# Patient Record
Sex: FEMALE | Race: WHITE | NOT HISPANIC OR LATINO | Employment: UNEMPLOYED | URBAN - METROPOLITAN AREA
[De-identification: names, ages, dates, MRNs, and addresses within clinical notes are randomized per-mention and may not be internally consistent; named-entity substitution may affect disease eponyms.]

---

## 2018-04-09 ENCOUNTER — HOSPITAL ENCOUNTER (OUTPATIENT)
Dept: URGENT CARE | Facility: CLINIC | Age: 28
Discharge: HOME | End: 2018-04-09
Attending: FAMILY MEDICINE | Admitting: FAMILY MEDICINE

## 2018-05-02 ENCOUNTER — HOSPITAL ENCOUNTER (OUTPATIENT)
Dept: PHYSICAL THERAPY | Facility: HOSPITAL | Age: 28
Setting detail: RECURRING SERIES
Discharge: HOME | End: 2018-07-29
Attending: FAMILY MEDICINE | Admitting: FAMILY MEDICINE

## 2019-05-30 ENCOUNTER — CONVERSION ENCOUNTER (OUTPATIENT)
Dept: URGENT CARE | Facility: CLINIC | Age: 29
End: 2019-05-30

## 2019-06-04 VITALS
HEART RATE: 126 BPM | WEIGHT: 153 LBS | HEIGHT: 66 IN | DIASTOLIC BLOOD PRESSURE: 84 MMHG | BODY MASS INDEX: 24.59 KG/M2 | RESPIRATION RATE: 16 BRPM | OXYGEN SATURATION: 100 % | SYSTOLIC BLOOD PRESSURE: 122 MMHG

## 2019-06-06 NOTE — PROGRESS NOTES
Visit Type:  Acute Visit  Referring Provider:  ULISES Garcia  Primary Provider:  David    CC:  sore throat/aches.    History of Present Illness:  Sore throat and earache x 2 days. Body aches, subjective fever/chills starting yesterday. Around some people who have strep. No n/v/d. A little cough/congestion      Vital Signs:    Patient Profile:    29 Years Old Female  LMP:        2019  Height:     66 inches  Weight:     153 pounds  BMI:        24.69     O2 Sat:     100 %  Temp:       99.0 degrees F oral  Pulse rate: 126 / minute  Resp:       16 per minute  BP Sittin / 84  (right arm)    Cuff size:  regular  Patient has a risk of falls? No  Patient in pain?    No    Problems: Active problems were reviewed with the patient during this visit.  Medications: Medications were reviewed with the patient during this visit.  Allergies: Allergies were reviewed with the patient during this visit.  No Known Allergy.  No Known Drug Allergy.    Last MP: 2019      Vitals Entered By: Kanwal Mcclain RN (May 30, 2019 8:05 AM)    Active Medications (reviewed today):  LOMOTIL 2.5-0.025 MG ORAL TABLET (DIPHENOXYLATE-ATROPINE) 1 tablet every 6 hours prn diarrhea  PROMETHAZINE HCL 25 MG ORAL TABLET (PROMETHAZINE HCL) 1 tablet q 4 hours prn nausea  * BIRTH CONTROL PILL     Current Allergies (reviewed today):  No known allergies      Past Medical History:     Reviewed history from 2016 and no changes required:        aplastic anemia    Past Surgical History:     Reviewed history from 2016 and no changes required:        Unremarkable    Family History Summary:      Reviewed history Last on 2018 and no changes required:2019  First Degree Blood Relative - Has No Known Family History - Entered On: 2016      Social History:     Reviewed history from 2016 and no changes required:        Patient has never smoked.        Passive Smoke: N        Alcohol Use: N        Drug Use: N        HIV/High  Risk: N        Regular Exercise: N        Hx Domestic Abuse: N        [employer] Affecting Care: N            Social History Summary:  Patient has never smoked.  Passive Smoke: N  Alcohol Use: N  Drug Use: N  HIV/High Risk: N  Regular Exercise: N  Hx Domestic Abuse: N  [employer] Affecting Care: N  Social History Reviewed: 05/30/2019          Risk Factors:     Smoked Tobacco Use:  Never smoker  Passive smoke exposure:  no  Alcohol use:  no      Review of Systems   General: Complains of headache, fevers, chills, body ache, fatigue.   Eyes: Denies irritation, discharge.   Ears/Nose/Throat: Complains of earache, nasal congestion, sore throat. Denies ear discharge, decreased hearing.   Respiratory: Complains of cough. Denies dyspnea, excessive sputum.   Gastrointestinal: Denies nausea, vomiting, diarrhea.   Skin: Denies rash, itching.         Physical Exam    General:      well developed, well nourished, in no acute distress.    Head:      normocephalic and atraumatic.    Eyes:      PERRL/EOM intact, conjunctiva and sclera clear with out nystagmus.    Ears:      clear fluid bilateral TMs, otherwise normal  Nose:      no deformity, discharge, inflammation, or lesions.    Mouth:      moderately injected and swollen. No exudate. Uvula midline  Neck:      no masses, thyromegaly, or abnormal cervical nodes.    Lungs:      clear bilaterally to auscultation.    Heart:      non-displaced PMI, chest non-tender; regular rate and rhythm, S1, S2 without murmurs, rubs, or gallops  Skin:      intact without lesions or rashes.    Psych:      alert and cooperative; normal mood and affect; normal attention span and concentration.        Blood Pressure:  Today's BP: 122/84 mm Hg            Impression & Recommendations:    Problem # 1:  Strep pharyngitis (ICD-034.0) (XFE57-W69.0)  Assessment: New    Her updated medication list for this problem includes:     Amoxicillin 875 Mg Oral Tablet (Amoxicillin) ..... 1 po bid      Medications Added to  Medication List This Visit:  1)  Amoxicillin 875 Mg Oral Tablet (Amoxicillin) .... 1 po bid    Other Orders:  Strep Screen (75693)      Patient Instructions:  1)  Your diagnosis of strep throat or pharyngitis is ususally associated with a bacterial infection. The infection will be treated with antibiotics. Other treatments may include Tylenol or ibuprofen, warm salt water, lozenges, lots of liquids and rest.   Discard your toothbrushes after 48 hours. If you have any unusual symptoms you should report them to your family doctor or return to the Urgent Care Center.  2)  The exam and treatment that you have received at the Urgent Care has been rendered on an urgent or immediate care basis only and is not intended to to be a substitute for your primary care doctor. It is important that you report any persistant or   worsening problems and see your physician for follow up care. It is impossible to recognize and treat all elements of an injury or illness in a single visit. If you are unable to contact your physician please call or return to the Urgent Care Center.      ]          Laceration/ Wound     Objective     cm  Assessment:     Plan:   Rx:   AMOXICILLIN 875 MG ORAL TABLET 1 po bid.      Technician: Mckenzie Baer MA               Date/Time Collected: May 30, 2019 8:21 AM)  Date/Time Received: May 30, 2019 8:21 AM)  Rapid Strep, Group A:    positive       Negative (normal range)          Electronically signed by Mary MONTGOMERY on 05/30/2019 at 8:23 AM  ________________________________________________________________________       Disclaimer: Converted Note message may not contain all data elements that existed in the legacy source system. Please see [employer] [facility] for the original note details.

## 2019-08-05 ENCOUNTER — APPOINTMENT (OUTPATIENT)
Dept: GENERAL RADIOLOGY | Facility: HOSPITAL | Age: 29
End: 2019-08-05

## 2019-08-05 ENCOUNTER — HOSPITAL ENCOUNTER (EMERGENCY)
Facility: HOSPITAL | Age: 29
Discharge: HOME | End: 2019-08-05
Admitting: EMERGENCY MEDICINE

## 2019-08-05 ENCOUNTER — TELEPHONE (OUTPATIENT)
Dept: CARDIOLOGY | Facility: CLINIC | Age: 29
End: 2019-08-05

## 2019-08-05 ENCOUNTER — APPOINTMENT (OUTPATIENT)
Dept: RESPIRATORY THERAPY | Facility: HOSPITAL | Age: 29
End: 2019-08-05

## 2019-08-05 VITALS
BODY MASS INDEX: 24.31 KG/M2 | OXYGEN SATURATION: 100 % | DIASTOLIC BLOOD PRESSURE: 59 MMHG | RESPIRATION RATE: 19 BRPM | SYSTOLIC BLOOD PRESSURE: 97 MMHG | WEIGHT: 151.24 LBS | HEART RATE: 82 BPM | TEMPERATURE: 98.8 F | HEIGHT: 66 IN

## 2019-08-05 DIAGNOSIS — R00.2 HEART PALPITATIONS: Primary | ICD-10-CM

## 2019-08-05 LAB
ALBUMIN SERPL-MCNC: 4.1 G/DL (ref 3.5–4.8)
ALBUMIN/GLOB SERPL: 1.3 G/DL (ref 1–1.7)
ALP SERPL-CCNC: 69 U/L (ref 32–91)
ALT SERPL W P-5'-P-CCNC: 14 U/L (ref 14–54)
ANION GAP SERPL CALCULATED.3IONS-SCNC: 14.9 MMOL/L (ref 5–15)
AST SERPL-CCNC: 22 U/L (ref 15–41)
B-HCG UR QL: NEGATIVE
BASOPHILS # BLD AUTO: 0 10*3/MM3 (ref 0–0.2)
BASOPHILS NFR BLD AUTO: 0.4 % (ref 0–1.5)
BILIRUB SERPL-MCNC: 0.7 MG/DL (ref 0.3–1.2)
BNP SERPL-MCNC: 32 PG/ML
BUN BLD-MCNC: 6 MG/DL (ref 8–20)
BUN/CREAT SERPL: 10 (ref 5.4–26.2)
CALCIUM SPEC-SCNC: 9.3 MG/DL (ref 8.9–10.3)
CHLORIDE SERPL-SCNC: 103 MMOL/L (ref 101–111)
CO2 SERPL-SCNC: 24 MMOL/L (ref 22–32)
CREAT BLD-MCNC: 0.6 MG/DL (ref 0.4–1)
D DIMER PPP FEU-MCNC: 0.5 MCGFEU/ML (ref 0.17–0.59)
DEPRECATED RDW RBC AUTO: 54.7 FL (ref 37–54)
EOSINOPHIL # BLD AUTO: 0 10*3/MM3 (ref 0–0.4)
EOSINOPHIL NFR BLD AUTO: 0.4 % (ref 0.3–6.2)
ERYTHROCYTE [DISTWIDTH] IN BLOOD BY AUTOMATED COUNT: 13.8 % (ref 12.3–15.4)
GFR SERPL CREATININE-BSD FRML MDRD: 117 ML/MIN/1.73
GLOBULIN UR ELPH-MCNC: 3.2 GM/DL (ref 2.5–3.8)
GLUCOSE BLD-MCNC: 114 MG/DL (ref 65–99)
HCT VFR BLD AUTO: 38 % (ref 34–46.6)
HGB BLD-MCNC: 13.4 G/DL (ref 12–15.9)
INR PPP: 0.94 (ref 0.9–1.1)
LYMPHOCYTES # BLD AUTO: 1.1 10*3/MM3 (ref 0.7–3.1)
LYMPHOCYTES NFR BLD AUTO: 24.6 % (ref 19.6–45.3)
MCH RBC QN AUTO: 39.3 PG (ref 26.6–33)
MCHC RBC AUTO-ENTMCNC: 35.2 G/DL (ref 31.5–35.7)
MCV RBC AUTO: 111.4 FL (ref 79–97)
MONOCYTES # BLD AUTO: 0.5 10*3/MM3 (ref 0.1–0.9)
MONOCYTES NFR BLD AUTO: 10.2 % (ref 5–12)
NEUTROPHILS # BLD AUTO: 2.9 10*3/MM3 (ref 1.7–7)
NEUTROPHILS NFR BLD AUTO: 64.4 % (ref 42.7–76)
NRBC BLD AUTO-RTO: 0.1 /100 WBC (ref 0–0.2)
PLATELET # BLD AUTO: 116 10*3/MM3 (ref 140–450)
PMV BLD AUTO: 7.9 FL (ref 6–12)
POTASSIUM BLD-SCNC: 3.9 MMOL/L (ref 3.6–5.1)
PROT SERPL-MCNC: 7.3 G/DL (ref 6.1–7.9)
PROTHROMBIN TIME: 9.8 SECONDS (ref 9.6–11.7)
RBC # BLD AUTO: 3.41 10*6/MM3 (ref 3.77–5.28)
SODIUM BLD-SCNC: 138 MMOL/L (ref 136–144)
TROPONIN I SERPL-MCNC: <0.03 NG/ML (ref 0–0.03)
WBC NRBC COR # BLD: 4.5 10*3/MM3 (ref 3.4–10.8)

## 2019-08-05 PROCEDURE — 93005 ELECTROCARDIOGRAM TRACING: CPT | Performed by: NURSE PRACTITIONER

## 2019-08-05 PROCEDURE — 83880 ASSAY OF NATRIURETIC PEPTIDE: CPT | Performed by: NURSE PRACTITIONER

## 2019-08-05 PROCEDURE — 71046 X-RAY EXAM CHEST 2 VIEWS: CPT

## 2019-08-05 PROCEDURE — 25010000002 KETOROLAC TROMETHAMINE PER 15 MG: Performed by: PHYSICIAN ASSISTANT

## 2019-08-05 PROCEDURE — 85379 FIBRIN DEGRADATION QUANT: CPT | Performed by: NURSE PRACTITIONER

## 2019-08-05 PROCEDURE — 96374 THER/PROPH/DIAG INJ IV PUSH: CPT

## 2019-08-05 PROCEDURE — 85610 PROTHROMBIN TIME: CPT | Performed by: NURSE PRACTITIONER

## 2019-08-05 PROCEDURE — 93225 XTRNL ECG REC<48 HRS REC: CPT

## 2019-08-05 PROCEDURE — 80053 COMPREHEN METABOLIC PANEL: CPT | Performed by: NURSE PRACTITIONER

## 2019-08-05 PROCEDURE — 84484 ASSAY OF TROPONIN QUANT: CPT | Performed by: NURSE PRACTITIONER

## 2019-08-05 PROCEDURE — 81025 URINE PREGNANCY TEST: CPT | Performed by: NURSE PRACTITIONER

## 2019-08-05 PROCEDURE — 99284 EMERGENCY DEPT VISIT MOD MDM: CPT

## 2019-08-05 PROCEDURE — 85025 COMPLETE CBC W/AUTO DIFF WBC: CPT | Performed by: NURSE PRACTITIONER

## 2019-08-05 PROCEDURE — 96375 TX/PRO/DX INJ NEW DRUG ADDON: CPT

## 2019-08-05 RX ORDER — SODIUM CHLORIDE 0.9 % (FLUSH) 0.9 %
10 SYRINGE (ML) INJECTION AS NEEDED
Status: DISCONTINUED | OUTPATIENT
Start: 2019-08-05 | End: 2019-08-05 | Stop reason: HOSPADM

## 2019-08-05 RX ORDER — KETOROLAC TROMETHAMINE 15 MG/ML
15 INJECTION, SOLUTION INTRAMUSCULAR; INTRAVENOUS ONCE
Status: COMPLETED | OUTPATIENT
Start: 2019-08-05 | End: 2019-08-05

## 2019-08-05 RX ADMIN — KETOROLAC TROMETHAMINE 15 MG: 15 INJECTION, SOLUTION INTRAMUSCULAR; INTRAVENOUS at 11:18

## 2019-08-05 RX ADMIN — FAMOTIDINE 20 MG: 10 INJECTION, SOLUTION INTRAVENOUS at 11:18

## 2019-08-05 RX ADMIN — SODIUM CHLORIDE 500 ML: 900 INJECTION, SOLUTION INTRAVENOUS at 11:18

## 2019-08-05 NOTE — ED NOTES
Pt reports heart palpations, has an appointment w/Dr Leahy tomorrow. Pt reports when she tries to rest, she feels her heart pounding. Denies SOA with palpitations.      Mariela Sol RN  08/05/19 [id number]

## 2019-08-05 NOTE — ED PROVIDER NOTES
Subjective   History:  She is a 29-year-old female presents to the ER with reported constant palpitations since Thursday.  Reports that she did take a Claritin with pseudoephedrine and her doctor believe that her palpitations may be secondary to this but they have not gone away and she DC that medicine.  She has follow-up scheduled tomorrow with cardiologist.  Denies any chest pain or chest pressure does report some reflux.    Onset: 4 to 5 days  Location: Chest  Duration: Constant  Character: Palpitations  Aggravating/Alleviating factors: None  Radiation none  Severity: Mild              Review of Systems   HENT: Negative.    Eyes: Negative.    Respiratory: Negative.    Cardiovascular: Positive for palpitations. Negative for chest pain.   Gastrointestinal: Negative.    Genitourinary: Negative.    Musculoskeletal: Negative.    Skin: Negative.    Neurological: Negative.    Psychiatric/Behavioral: Negative.        No past medical history on file.    Allergies   Allergen Reactions   • Amoxicillin Palpitations       No past surgical history on file.    No family history on file.    Social History     Socioeconomic History   • Marital status:      Spouse name: Not on file   • Number of children: Not on file   • Years of education: Not on file   • Highest education level: Not on file           Objective   Physical Exam   Constitutional: She is oriented to person, place, and time. She appears well-developed and well-nourished.   HENT:   Head: Normocephalic and atraumatic.   Eyes: Pupils are equal, round, and reactive to light.   Neck: Normal range of motion.   Cardiovascular: Normal rate and regular rhythm.   Pulmonary/Chest: Effort normal and breath sounds normal.   Abdominal: Soft.   Musculoskeletal: Normal range of motion.   Neurological: She is alert and oriented to person, place, and time.   Skin: Skin is warm and dry.   Psychiatric: She has a normal mood and affect. Her behavior is normal.       Procedures            ED Course      Xr Chest 2 View    Result Date: 8/5/2019  No acute chest findings. No significant change since 04/09/2018.  Electronically Signed By-Dr. Richelle Flores MD On:8/5/2019 10:32 AM This report was finalized on [id number] by Dr. Richelle Flores MD.    Labs Reviewed   COMPREHENSIVE METABOLIC PANEL - Abnormal; Notable for the following components:       Result Value    Glucose 114 (*)     BUN 6 (*)     All other components within normal limits   CBC WITH AUTO DIFFERENTIAL - Abnormal; Notable for the following components:    RBC 3.41 (*)     .4 (*)     MCH 39.3 (*)     RDW-SD 54.7 (*)     Platelets 116 (*)     All other components within normal limits   PROTIME-INR - Normal   BNP (IN-HOUSE) - Normal   TROPONIN (IN-HOUSE) - Normal    Narrative:     Troponin I Reference Range:    0.00-0.03  Negative.  Repeat testing in 4-6 hours if clinically indicated.    0.04-0.29  Suspicious for myocardial injury. Serial measurements and clinical  correlation may be necessary to confirm or exclude diagnosis of acute  coronary syndrome.  Repeat testing in 4-6 hours if indicated.     >0.29 Consistent with myocardial injury.  Recommend clinical and laboratory correlation.     Results my be falsely decreased if patient taking Biotin.    D-DIMER, QUANTITATIVE - Normal    Narrative:     Reference Range  --------------------------------------------------------------------     < 0.50   Negative Predictive Value  0.50-0.59   Indeterminate    >= 0.60   Probable VTE             A very low percentage of patients with DVT may yield D-Dimer results   below the cut-off of 0.50 MCGFEU/mL.  This is known to be more   prevalent in patients with distal DVT.             Results of this test should always be interpreted in conjunction with   the patient's medical history, clinical presentation and other   findings.  Clinical diagnosis should not be based on the result of   INNOVANCE D-Dimer alone.   PREGNANCY, URINE - Normal   CBC  AND DIFFERENTIAL    Narrative:     The following orders were created for panel order CBC & Differential.  Procedure                               Abnormality         Status                     ---------                               -----------         ------                     Scan Slide[[id number]]                                                                  CBC Auto Differential[[id number]]        Abnormal            Final result                 Please view results for these tests on the individual orders.     Medications   sodium chloride 0.9 % flush 10 mL (not administered)   sodium chloride 0.9 % bolus 500 mL (500 mL Intravenous New Bag 8/5/19 1118)   ketorolac (TORADOL) injection 15 mg (15 mg Intravenous Given 8/5/19 1118)   famotidine (PEPCID) injection 20 mg (20 mg Intravenous Given 8/5/19 1118)                 MDM  Number of Diagnoses or Management Options  Heart palpitations:   Diagnosis management comments: DISPOSITION:   Chart Review:  Comorbidity:  has no past medical history on file.  Differentials:this list is not all inclusive and does not constitute the entirety of considered causes --> palpitations secondary to cardiac etiology secondary to recent psuedophedrine use, PE  ECG: interpreted by ER physician and reviewed by myself: Sinus rhythm  Labs: CBC - WBC/Hgb/Hct/Plts: --/13.4/38.0/-- (08/05 1009) LYTES - Na/K/Cl/CO2: 138/3.9/103/24.0 (08/05 1009) CHEM - BUN/Cr/glu/ALT/AST/amyl/lip:6*/--/--/14/22/--/-- (08/05 1009) COAG - PT/INR/PTT: 9.8/0.94/-- (08/05 1009) .rr    Imaging: Was interpreted by physician and reviewed by myself:  Xr Chest 2 View    Result Date: 8/5/2019  No acute chest findings. No significant change since 04/09/2018.  Electronically Signed By-Dr. Richelle Flores MD On:8/5/2019 10:32 AM This report was finalized on [id number] by Dr. Richelle Flores MD.      Disposition/Treatment:  Patient is a 29-year-old female presents to the ER with consistent heart palpitations for the past  4 days.  Patient was given Protonix and Toradol in the ER her heart palpitations did come down there is no evidence of heart palpitations on EKG.  Her lab work is all normal chest x-ray negative. She was discharged home with a holter monitor and told to follow up tomorrow with Dr. Parmar. She was stable and in agreement with plan.  Low heart score.       Amount and/or Complexity of Data Reviewed  Clinical lab tests: reviewed  Tests in the radiology section of CPT®: reviewed  Tests in the medicine section of CPT®: reviewed    Patient Progress  Patient progress: stable        Final diagnoses:   Heart palpitations            Theresa Carrington PA-C  08/05/19 1202

## 2019-08-05 NOTE — TELEPHONE ENCOUNTER
Pt c/o rapid heart rate and pounding chest.   Says has been able to sleep last 4 days   Resting heart rate at 100 or above. A little dizzy no soa.  Advised to go to ER  Understood.

## 2019-08-06 ENCOUNTER — OFFICE VISIT (OUTPATIENT)
Dept: CARDIOLOGY | Facility: CLINIC | Age: 29
End: 2019-08-06

## 2019-08-06 VITALS
HEIGHT: 66 IN | WEIGHT: 151 LBS | SYSTOLIC BLOOD PRESSURE: 121 MMHG | BODY MASS INDEX: 24.27 KG/M2 | DIASTOLIC BLOOD PRESSURE: 83 MMHG | HEART RATE: 81 BPM | OXYGEN SATURATION: 100 %

## 2019-08-06 DIAGNOSIS — R00.2 PALPITATIONS: Primary | ICD-10-CM

## 2019-08-06 PROCEDURE — 99203 OFFICE O/P NEW LOW 30 MIN: CPT | Performed by: INTERNAL MEDICINE

## 2019-08-06 RX ORDER — LEVONORGESTREL AND ETHINYL ESTRADIOL 0.1-0.02MG
1 KIT ORAL DAILY
Refills: 0 | COMMUNITY
Start: 2019-05-19
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
## 2019-08-09 PROCEDURE — 93227 XTRNL ECG REC<48 HR R&I: CPT | Performed by: INTERNAL MEDICINE

## 2019-08-12 ENCOUNTER — OFFICE (AMBULATORY)
Dept: URBAN - METROPOLITAN AREA CLINIC 64 | Facility: CLINIC | Age: 29
End: 2019-08-12

## 2019-08-12 VITALS
WEIGHT: 150 LBS | HEIGHT: 65 IN | DIASTOLIC BLOOD PRESSURE: 82 MMHG | SYSTOLIC BLOOD PRESSURE: 119 MMHG | HEART RATE: 77 BPM

## 2019-08-12 DIAGNOSIS — R00.2 PALPITATIONS: ICD-10-CM

## 2019-08-12 DIAGNOSIS — K21.9 GASTRO-ESOPHAGEAL REFLUX DISEASE WITHOUT ESOPHAGITIS: ICD-10-CM

## 2019-08-12 DIAGNOSIS — R07.89 OTHER CHEST PAIN: ICD-10-CM

## 2019-08-12 PROCEDURE — 99202 OFFICE O/P NEW SF 15 MIN: CPT | Performed by: INTERNAL MEDICINE

## 2019-08-12 RX ORDER — OMEPRAZOLE 40 MG/1
80 CAPSULE, DELAYED RELEASE ORAL
Qty: 60 | Refills: 3 | Status: COMPLETED
End: 2019-09-10

## 2019-08-15 ENCOUNTER — HOSPITAL ENCOUNTER (OUTPATIENT)
Dept: CARDIOLOGY | Facility: HOSPITAL | Age: 29
Discharge: HOME | End: 2019-08-15
Admitting: INTERNAL MEDICINE

## 2019-08-15 ENCOUNTER — OFFICE VISIT (OUTPATIENT)
Dept: CARDIOLOGY | Facility: CLINIC | Age: 29
End: 2019-08-15

## 2019-08-15 VITALS
HEIGHT: 66 IN | DIASTOLIC BLOOD PRESSURE: 68 MMHG | OXYGEN SATURATION: 100 % | BODY MASS INDEX: 24.27 KG/M2 | WEIGHT: 151 LBS | SYSTOLIC BLOOD PRESSURE: 109 MMHG | HEART RATE: 81 BPM

## 2019-08-15 VITALS
BODY MASS INDEX: 24.11 KG/M2 | WEIGHT: 150 LBS | HEIGHT: 66 IN | DIASTOLIC BLOOD PRESSURE: 58 MMHG | SYSTOLIC BLOOD PRESSURE: 102 MMHG

## 2019-08-15 DIAGNOSIS — R00.2 PALPITATIONS: ICD-10-CM

## 2019-08-15 DIAGNOSIS — R00.2 PALPITATIONS: Primary | ICD-10-CM

## 2019-08-15 LAB
BH CV ECHO MEAS - ACS: 2 CM
BH CV ECHO MEAS - AO MAX PG (FULL): 2.9 MMHG
BH CV ECHO MEAS - AO MAX PG: 8.3 MMHG
BH CV ECHO MEAS - AO MEAN PG (FULL): 1.1 MMHG
BH CV ECHO MEAS - AO MEAN PG: 4 MMHG
BH CV ECHO MEAS - AO ROOT AREA: 5 CM^2
BH CV ECHO MEAS - AO ROOT DIAM: 2.5 CM
BH CV ECHO MEAS - AO V2 MAX: 143.9 CM/SEC
BH CV ECHO MEAS - AO V2 MEAN: 94.3 CM/SEC
BH CV ECHO MEAS - AO V2 VTI: 25.4 CM
BH CV ECHO MEAS - ASC AORTA: 2.4 CM
BH CV ECHO MEAS - AVA(I,A): 2.1 CM^2
BH CV ECHO MEAS - AVA(I,D): 2.1 CM^2
BH CV ECHO MEAS - AVA(V,A): 2.2 CM^2
BH CV ECHO MEAS - AVA(V,D): 2.2 CM^2
BH CV ECHO MEAS - EDV(CUBED): 92.8 ML
BH CV ECHO MEAS - EDV(MOD-SP4): 69.1 ML
BH CV ECHO MEAS - EDV(TEICH): 93.8 ML
BH CV ECHO MEAS - EF(CUBED): 40 %
BH CV ECHO MEAS - EF(MOD-SP4): 73.7 %
BH CV ECHO MEAS - EF(TEICH): 33.2 %
BH CV ECHO MEAS - ESV(CUBED): 55.6 ML
BH CV ECHO MEAS - ESV(MOD-SP4): 18.2 ML
BH CV ECHO MEAS - ESV(TEICH): 62.6 ML
BH CV ECHO MEAS - FS: 15.7 %
BH CV ECHO MEAS - IVS/LVPW: 0.88
BH CV ECHO MEAS - IVSD: 0.67 CM
BH CV ECHO MEAS - LA DIMENSION: 2.3 CM
BH CV ECHO MEAS - LA/AO: 0.92
BH CV ECHO MEAS - LV MASS(C)D: 98.8 GRAMS
BH CV ECHO MEAS - LV MAX PG: 5.4 MMHG
BH CV ECHO MEAS - LV MEAN PG: 2.9 MMHG
BH CV ECHO MEAS - LV V1 MAX: 115.8 CM/SEC
BH CV ECHO MEAS - LV V1 MEAN: 81.2 CM/SEC
BH CV ECHO MEAS - LV V1 VTI: 19.5 CM
BH CV ECHO MEAS - LVIDD: 4.5 CM
BH CV ECHO MEAS - LVIDS: 3.8 CM
BH CV ECHO MEAS - LVOT AREA: 2.7 CM^2
BH CV ECHO MEAS - LVOT DIAM: 1.8 CM
BH CV ECHO MEAS - LVPWD: 0.76 CM
BH CV ECHO MEAS - MV A MAX VEL: 74 CM/SEC
BH CV ECHO MEAS - MV DEC SLOPE: 546.8 CM/SEC^2
BH CV ECHO MEAS - MV DEC TIME: 0.23 SEC
BH CV ECHO MEAS - MV E MAX VEL: 124.1 CM/SEC
BH CV ECHO MEAS - MV E/A: 1.7
BH CV ECHO MEAS - MV MAX PG: 4.7 MMHG
BH CV ECHO MEAS - MV MEAN PG: 2.4 MMHG
BH CV ECHO MEAS - MV V2 MAX: 108 CM/SEC
BH CV ECHO MEAS - MV V2 MEAN: 75.7 CM/SEC
BH CV ECHO MEAS - MV V2 VTI: 22.5 CM
BH CV ECHO MEAS - MVA(VTI): 2.3 CM^2
BH CV ECHO MEAS - PA ACC TIME: 0.1 SEC
BH CV ECHO MEAS - PA MAX PG (FULL): 2.7 MMHG
BH CV ECHO MEAS - PA MAX PG: 6 MMHG
BH CV ECHO MEAS - PA PR(ACCEL): 35.4 MMHG
BH CV ECHO MEAS - PA V2 MAX: 122.4 CM/SEC
BH CV ECHO MEAS - PI END-D VEL: 95.1 CM/SEC
BH CV ECHO MEAS - PI MAX PG: 15.4 MMHG
BH CV ECHO MEAS - PI MAX VEL: 196 CM/SEC
BH CV ECHO MEAS - RAP SYSTOLE: 3 MMHG
BH CV ECHO MEAS - RV MAX PG: 3.3 MMHG
BH CV ECHO MEAS - RV MEAN PG: 1.7 MMHG
BH CV ECHO MEAS - RV V1 MAX: 91 CM/SEC
BH CV ECHO MEAS - RV V1 MEAN: 62.3 CM/SEC
BH CV ECHO MEAS - RV V1 VTI: 15.4 CM
BH CV ECHO MEAS - RVDD: 2.6 CM
BH CV ECHO MEAS - RVSP: 19.9 MMHG
BH CV ECHO MEAS - SV(AO): 126.5 ML
BH CV ECHO MEAS - SV(CUBED): 37.2 ML
BH CV ECHO MEAS - SV(LVOT): 52.3 ML
BH CV ECHO MEAS - SV(MOD-SP4): 51 ML
BH CV ECHO MEAS - SV(TEICH): 31.1 ML
BH CV ECHO MEAS - TR MAX VEL: 204.3 CM/SEC
MAXIMAL PREDICTED HEART RATE: 189 BPM
STRESS TARGET HR: 161 BPM

## 2019-08-15 PROCEDURE — 93306 TTE W/DOPPLER COMPLETE: CPT | Performed by: INTERNAL MEDICINE

## 2019-08-15 PROCEDURE — 99213 OFFICE O/P EST LOW 20 MIN: CPT | Performed by: INTERNAL MEDICINE

## 2019-08-15 PROCEDURE — 93306 TTE W/DOPPLER COMPLETE: CPT

## 2019-08-15 RX ORDER — OMEPRAZOLE 20 MG/1
20 CAPSULE, DELAYED RELEASE ORAL 2 TIMES DAILY
COMMUNITY
End: 2021-01-20

## 2019-08-15 NOTE — PROGRESS NOTES
Encounter Date:08/15/2019  Last seen 8/6/2019      Patient ID: Pina Denis is a 29 y.o. female.    Chief Complaint:  Palpitations      History of Present Illness  Patient has improved significantly.  Since I have last seen, the patient has been without any chest discomfort ,shortness of breath, palpitations, dizziness or syncope.  Denies having any headache ,abdominal pain ,nausea, vomiting , diarrhea constipation, loss of weight or loss of appetite.  Denies having any excessive bruising ,hematuria or blood in the stool.    Review of all systems negative except as indicated    Assessment and Plan     ]]]]]]]]]]]]]]]]]  Impression  ==========  -Palpitations possible SVT although no documentation is available at this time.-Improved.  48-hour Holter monitor showed sinus rhythm with occasional premature ventricular contractions.  Echocardiogram 8/15/2019 is normal.    -Borderline potassium at 3.8.  Magnesium level is not available.    -Family history is positive for coronary disease.     - Intolerance to amoxicillin due to palpitations.     - Non-smoker.  =============  Plan  =========  Palpitations have improved since patient has been taking over-the-counter magnesium supplements.  48-hour Holter monitor was reviewed with the patient.  Sinus rhythm with occasional premature ventricular contractions.  EKG from recent ER visit was reviewed that shows sinus rhythm without any ischemic changes.  Independently reviewed.  Labs were reviewed that showed borderline potassium at 3.8.  48-hour Holter monitor in place.  Patient to drink orange juice and eat bananas.  Over-the-counter magnesium supplements.  Echocardiogram.-Normal as above  Follow-up in the office in 6 months  Further plan will depend on patient's progress.  Consideration will be given for beta-blocker in the future if patient has recurrence of symptoms.  ]]]]]]]]]]]]]]]]                    Diagnosis Plan   1. Palpitations     LAB RESULTS (LAST 7  DAYS)    CBC        BMP        CMP         BNP        TROPONIN        CoAg        Creatinine Clearance  Estimated Creatinine Clearance: 146.9 mL/min (by C-G formula based on SCr of 0.6 mg/dL).    ABG        Radiology  No radiology results for the last day                The following portions of the patient's history were reviewed and updated as appropriate: allergies, current medications, past family history, past medical history, past social history, past surgical history and problem list.    Review of Systems   Constitution: Negative for malaise/fatigue.   Cardiovascular: Positive for palpitations (occasional ). Negative for chest pain, leg swelling and syncope.   Respiratory: Negative for shortness of breath.    Skin: Negative for rash.   Gastrointestinal: Negative for nausea and vomiting.   Neurological: Negative for dizziness, light-headedness and numbness.         Current Outpatient Medications:   •  LESSINA 0.1-20 MG-MCG per tablet, Take 1 tablet by mouth Daily., Disp: , Rfl: 0  •  omeprazole (priLOSEC) 20 MG capsule, Take 20 mg by mouth 2 (Two) Times a Day., Disp: , Rfl:     Allergies   Allergen Reactions   • Amoxicillin Palpitations       Family History   Problem Relation Age of Onset   • Hypertension Mother    • Thyroid disease Mother    • Heart disease Paternal Great-Grandfather    • Heart attack Paternal Great-Grandfather    • Heart attack Paternal Great-Grandmother    • Heart disease Paternal Great-Grandmother        History reviewed. No pertinent surgical history.    Past Medical History:   Diagnosis Date   • Aplastic anemia (CMS/HCC)    • Chronic idiopathic thrombocytopenia (CMS/HCC)        Family History   Problem Relation Age of Onset   • Hypertension Mother    • Thyroid disease Mother    • Heart disease Paternal Great-Grandfather    • Heart attack Paternal Great-Grandfather    • Heart attack Paternal Great-Grandmother    • Heart disease Paternal Great-Grandmother        Social History  "    Socioeconomic History   • Marital status:      Spouse name: Not on file   • Number of children: Not on file   • Years of education: Not on file   • Highest education level: Not on file   Tobacco Use   • Smoking status: Never Smoker   • Smokeless tobacco: Never Used   Substance and Sexual Activity   • Alcohol use: Yes     Comment: consumed monthly, a glass of wine          Procedures      Objective:       Physical Exam    /68 (BP Location: Right arm, Patient Position: Sitting, Cuff Size: Adult)   Pulse 81   Ht 167.6 cm (66\")   Wt 68.5 kg (151 lb)   SpO2 100%   BMI 24.37 kg/m²   The patient is alert, oriented and in no distress.    Vital signs as noted above.    Head and neck revealed no carotid bruits or jugular venous distension.  No thyromegaly or lymphadenopathy is present.    Lungs clear.  No wheezing.  Breath sounds are normal bilaterally.    Heart normal first and second heart sounds.  No murmur..  No pericardial rub is present.  No gallop is present.    Abdomen soft and nontender.  No organomegaly is present.    Extremities revealed good peripheral pulses without any pedal edema.    Skin warm and dry.    Musculoskeletal system is grossly normal.    CNS grossly normal.        "

## 2019-09-11 ENCOUNTER — OFFICE (AMBULATORY)
Dept: URBAN - METROPOLITAN AREA PATHOLOGY 4 | Facility: PATHOLOGY | Age: 29
End: 2019-09-11
Payer: COMMERCIAL

## 2019-09-11 ENCOUNTER — ON CAMPUS - OUTPATIENT (AMBULATORY)
Dept: URBAN - METROPOLITAN AREA HOSPITAL 2 | Facility: HOSPITAL | Age: 29
End: 2019-09-11
Payer: COMMERCIAL

## 2019-09-11 VITALS
HEART RATE: 70 BPM | SYSTOLIC BLOOD PRESSURE: 110 MMHG | DIASTOLIC BLOOD PRESSURE: 80 MMHG | TEMPERATURE: 97.5 F | DIASTOLIC BLOOD PRESSURE: 83 MMHG | DIASTOLIC BLOOD PRESSURE: 69 MMHG | SYSTOLIC BLOOD PRESSURE: 106 MMHG | RESPIRATION RATE: 18 BRPM | DIASTOLIC BLOOD PRESSURE: 67 MMHG | RESPIRATION RATE: 16 BRPM | OXYGEN SATURATION: 98 % | SYSTOLIC BLOOD PRESSURE: 123 MMHG | HEIGHT: 65 IN | HEART RATE: 106 BPM | DIASTOLIC BLOOD PRESSURE: 71 MMHG | HEART RATE: 80 BPM | WEIGHT: 144 LBS | SYSTOLIC BLOOD PRESSURE: 113 MMHG | OXYGEN SATURATION: 100 % | HEART RATE: 77 BPM

## 2019-09-11 DIAGNOSIS — R07.89 OTHER CHEST PAIN: ICD-10-CM

## 2019-09-11 DIAGNOSIS — K21.9 GASTRO-ESOPHAGEAL REFLUX DISEASE WITHOUT ESOPHAGITIS: ICD-10-CM

## 2019-09-11 DIAGNOSIS — K31.7 POLYP OF STOMACH AND DUODENUM: ICD-10-CM

## 2019-09-11 LAB
GI HISTOLOGY: A. UNSPECIFIED: (no result)
GI HISTOLOGY: PDF REPORT: (no result)

## 2019-09-11 PROCEDURE — 43239 EGD BIOPSY SINGLE/MULTIPLE: CPT | Performed by: INTERNAL MEDICINE

## 2019-09-11 PROCEDURE — 88305 TISSUE EXAM BY PATHOLOGIST: CPT | Performed by: INTERNAL MEDICINE

## 2020-05-29 ENCOUNTER — TREATMENT (OUTPATIENT)
Dept: PHYSICAL THERAPY | Facility: CLINIC | Age: 30
End: 2020-05-29

## 2020-05-29 ENCOUNTER — TRANSCRIBE ORDERS (OUTPATIENT)
Dept: PHYSICAL THERAPY | Facility: CLINIC | Age: 30
End: 2020-05-29

## 2020-05-29 DIAGNOSIS — M54.50 LOW BACK PAIN, UNSPECIFIED BACK PAIN LATERALITY, UNSPECIFIED CHRONICITY, UNSPECIFIED WHETHER SCIATICA PRESENT: Primary | ICD-10-CM

## 2020-05-29 DIAGNOSIS — M41.9 SCOLIOSIS OF THORACIC SPINE, UNSPECIFIED SCOLIOSIS TYPE: Primary | ICD-10-CM

## 2020-05-29 DIAGNOSIS — S39.012A STRAIN OF LUMBAR REGION, INITIAL ENCOUNTER: ICD-10-CM

## 2020-05-29 PROCEDURE — 97014 ELECTRIC STIMULATION THERAPY: CPT | Performed by: PHYSICAL THERAPIST

## 2020-05-29 PROCEDURE — 97140 MANUAL THERAPY 1/> REGIONS: CPT | Performed by: PHYSICAL THERAPIST

## 2020-05-29 PROCEDURE — 97110 THERAPEUTIC EXERCISES: CPT | Performed by: PHYSICAL THERAPIST

## 2020-05-29 PROCEDURE — 97161 PT EVAL LOW COMPLEX 20 MIN: CPT | Performed by: PHYSICAL THERAPIST

## 2020-05-29 NOTE — PROGRESS NOTES
Physical Therapy Initial Evaluation and Plan of Care    Patient: Pina Denis   : 1990  Diagnosis/ICD-10 Code:  Low back pain, unspecified back pain laterality, unspecified chronicity, unspecified whether sciatica present [M54.5]  Referring practitioner: Nolan Hernandez MD  Date of Initial Visit: 2020  Today's Date: 2020  Patient seen for 1 sessions           Subjective Questionnaire: Oswestry: 58%  Pain began 6 wks ago after picking up rock.  Had 2 incidents of irritation.  Past hx of lumbar SI pelvic imbalances corrected with PT and ex.  Went to ER, mm relaxers, NSAIDS and steroids.  Is doing a little better but cannot tolerate sitting, bending or lifting or putting on shoes.  Was originally shifted but has worked that out.  Some ant hip discomfort bilat, primary referred pain left post hip and leg.      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 Quality of life: good    Pain  Current pain rating: 3  Quality: radiating, tight, sharp, dull ache, discomfort and pulling  Relieving factors: change in position, rest and medications  Aggravating factors: lifting, prolonged positioning, repetitive movement, standing and movement  Progression: improved    Social Support  Lives with: young children and spouse    Diagnostic Tests  No diagnostic tests performed    Treatments  Previous treatment: medication  Patient Goals  Patient goals for therapy: decreased pain, increased motion, return to sport/leisure activities, independence with ADLs/IADLs and increased strength             Objective          Palpation     Additional Palpation Details  Generalized tender lumbar/SI region, negative palpation of piriformis/post hip left    Passive Range of Motion     Additional Passive Range of Motion Details  Able to flex lumbar in all 4's position to 100 degrees hip flexion but painful in standing  unsupported as well as painful knee to chest left greater than right. Moderate tight hams/neural tension structures left and right at 45 degrees SLR positioning.      Strength/Myotome Testing     Additional Strength Details  Weakness due to pain and muscle guarding but no myotome weakness noted    Lumbar Flexibility Comments:   Hypermobile history    Ambulation     Ambulation: Level Surfaces     Additional Level Surfaces Ambulation Details  Antalgic gait and transitional movement    Comments   Stands in increased lumbar lordosis but level pelvis          Assessment & Plan     Assessment  Impairments: abnormal muscle tone, abnormal or restricted ROM, activity intolerance, impaired physical strength, lacks appropriate home exercise program and pain with function  Assessment details: Hx of hypermobility especially in pelvic area.  Recent lumbar spine irritation due to repetitive movements.  Posture is with increased lumbar lordosis and facet loading.  Patient will benefit from use of modalities to reduce muscle guarding and pain, focus of proximals stabilization and lumbar flex mobility to reduce facet loading.  Treatment will include HEP  Prognosis: good  Functional Limitations: carrying objects, lifting, walking, uncomfortable because of pain, sitting and standing  Goals  Plan Goals: STG  Reported ability to sit improved by 50% in 1 week    LTG Return to full ADL's with minimal pain reports by discharge          Level pelvis consistent by discharge          Oswestry to 20% or less by discharge               Plan  Therapy options: will be seen for skilled physical therapy services  Planned modality interventions: electrical stimulation/Djiboutian stimulation, ultrasound and thermotherapy (hydrocollator packs)  Planned therapy interventions: home exercise program, strengthening and neuromuscular re-education  Treatment plan discussed with: patient  Plan details: Suggest PT 2X weekly up to 12 visits to reach above  goals        Timed:         Manual Therapy:    15     mins  62228;     Therapeutic Exercise:    15     mins  01285;     Neuromuscular Neal:    0    mins  84472;    Therapeutic Activity:     0     mins  03292;     Gait Trainin     mins  04290;     Ultrasound:     0     mins  72824;    Ionto                               0    mins   96396    Un-Timed:  Electrical Stimulation:    15     mins  83151 ( );  Dry Needling     0     mins self-pay  Traction     0     mins 21696  Low Eval     15     Mins  51665  Mod Eval     0     Mins  42107  High Eval                       0     Mins  32898        Timed Treatment:   30   mins   Total Treatment:     60   mins    PT SIGNATURE: Yohana Holly, YARY   DATE TREATMENT INITIATED: 2020    Initial Certification  Certification Period: 2020  I certify that the therapy services are furnished while this patient is under my care.  The services outlined above are required by this patient, and will be reviewed every 90 days.     PHYSICIAN: Nolan Hernandez MD      DATE:     Please sign and return via fax to [phone].. Thank you, [facility].

## 2020-06-01 ENCOUNTER — TREATMENT (OUTPATIENT)
Dept: PHYSICAL THERAPY | Facility: CLINIC | Age: 30
End: 2020-06-01

## 2020-06-01 DIAGNOSIS — S39.012A STRAIN OF LUMBAR REGION, INITIAL ENCOUNTER: ICD-10-CM

## 2020-06-01 DIAGNOSIS — M54.50 LOW BACK PAIN, UNSPECIFIED BACK PAIN LATERALITY, UNSPECIFIED CHRONICITY, UNSPECIFIED WHETHER SCIATICA PRESENT: Primary | ICD-10-CM

## 2020-06-01 PROCEDURE — 97014 ELECTRIC STIMULATION THERAPY: CPT | Performed by: PHYSICAL THERAPIST

## 2020-06-01 PROCEDURE — 97110 THERAPEUTIC EXERCISES: CPT | Performed by: PHYSICAL THERAPIST

## 2020-06-01 PROCEDURE — 97140 MANUAL THERAPY 1/> REGIONS: CPT | Performed by: PHYSICAL THERAPIST

## 2020-06-01 NOTE — PROGRESS NOTES
Physical Therapy Daily Progress Note      Patient: Pina Denis   : 1990  Diagnosis/ICD-10 Code:  Low back pain, unspecified back pain laterality, unspecified chronicity, unspecified whether sciatica present [M54.5]  Referring practitioner: Nolan Hernandez MD  Date of Initial Visit: Type: THERAPY  Noted: 2020  Today's Date: 2020  Patient seen for 2 sessions         Pina Denis reports: she has been much improved but still has been having pain daily int he low back stating long periods of sitting are still what aggravates her back the most. Pt. States as long as she moves and gets some exercise she can help relieve the soreness. Pt. States she is currently not on mm relaxer's and has been managing her pain solely with exercise.    Objective   See Exercise, Manual, and Modality Logs for complete treatment.     Assessment/Plan   Pt. To continue progressive stretching of lumbar and LE musculature. Pt. Performs well in clinic this date however moves slowly through all positional transitions due to R sided low back soreness presenting in the SI region. Pt. Was educated on how muscles have become weak and tight causing improper pull and strain on her low back and causing malalignment of pelvis. Pt. Was reinforced on technique of current HEP and given two new exercises thoracic rotation and Cat/Camel stretch to continue improved lumbar flexibility and to relieve pain.    Progress per Plan of Care           Timed:         Manual Therapy:    9     mins  77949;     Therapeutic Exercise:    31     mins  34670;     Neuromuscular Neal:        mins  69310;    Therapeutic Activity:          mins  17778;     Gait Training:           mins  71383;     Ultrasound:          mins  40293;    Ionto                                   mins   54618  Self Care                            mins   36216  Canalith Repos                   mins  4209    Un-Timed:  Electrical Stimulation:    15     mins  72098 (  );  Dry Needling          mins self-pay  Traction          mins 81315  Low Eval          Mins  48056  Mod Eval          Mins  80449  High Eval                            Mins  19974    Timed Treatment:   40   mins   Total Treatment:     55   mins    Leidy Romo PTA  Physical Therapist Assistant License #[id number]

## 2020-06-08 ENCOUNTER — TREATMENT (OUTPATIENT)
Dept: PHYSICAL THERAPY | Facility: CLINIC | Age: 30
End: 2020-06-08

## 2020-06-08 DIAGNOSIS — S39.012A STRAIN OF LUMBAR REGION, INITIAL ENCOUNTER: ICD-10-CM

## 2020-06-08 DIAGNOSIS — M54.50 LOW BACK PAIN, UNSPECIFIED BACK PAIN LATERALITY, UNSPECIFIED CHRONICITY, UNSPECIFIED WHETHER SCIATICA PRESENT: Primary | ICD-10-CM

## 2020-06-08 PROCEDURE — 97110 THERAPEUTIC EXERCISES: CPT | Performed by: PHYSICAL THERAPIST

## 2020-06-08 PROCEDURE — 97014 ELECTRIC STIMULATION THERAPY: CPT | Performed by: PHYSICAL THERAPIST

## 2020-06-08 PROCEDURE — 97140 MANUAL THERAPY 1/> REGIONS: CPT | Performed by: PHYSICAL THERAPIST

## 2020-06-08 NOTE — PROGRESS NOTES
Physical Therapy Daily Progress Note      Patient: Pina Denis   : 1990  Diagnosis/ICD-10 Code:  Low back pain, unspecified back pain laterality, unspecified chronicity, unspecified whether sciatica present [M54.5]  Referring practitioner: Nolan Hernandez MD  Date of Initial Visit: Type: THERAPY  Noted: 2020  Today's Date: 2020  Patient seen for 3 sessions         Pina Denis reports: she has had progressed low back pain and palpable tightness recently over B SI region. Pt. Continues to perform daily HEP however has had new symptoms into R LE reporting soreness in lateral thigh and R calf intermittently changing throughout the day stating it gets worse when riding in the car.    Objective   See Exercise, Manual, and Modality Logs for complete treatment.     Assessment/Plan   Pt. Tolerates exercise and stretch well this date with improving symptoms throughout however all positional changes increase discomfort in low back. Pt. Was educated throughout treatment this date of how symptoms such as what she Is experiencing can be a direct effect of the muscle weakness and posture imbalances she currently has.    Progress per Plan of Care           Timed:         Manual Therapy:    20     mins  08368;     Therapeutic Exercise:    26     mins  57291;     Neuromuscular Neal:        mins  94097;    Therapeutic Activity:          mins  43248;     Gait Training:           mins  01568;     Ultrasound:          mins  89136;    Ionto                                   mins   79178  Self Care                            mins   23719  Canalith Repos                   mins  4209    Un-Timed:  Electrical Stimulation:    15     mins  17437 ( );  Dry Needling          mins self-pay  Traction          mins 31018  Low Eval          Mins  96777  Mod Eval          Mins  95204  High Eval                            Mins  56050    Timed Treatment:   46   mins   Total Treatment:     61   mins    Leidy Romo  INGRID  Physical Therapist Assistant License #[id number]

## 2020-06-15 ENCOUNTER — TREATMENT (OUTPATIENT)
Dept: PHYSICAL THERAPY | Facility: CLINIC | Age: 30
End: 2020-06-15

## 2020-06-15 DIAGNOSIS — M54.50 LOW BACK PAIN, UNSPECIFIED BACK PAIN LATERALITY, UNSPECIFIED CHRONICITY, UNSPECIFIED WHETHER SCIATICA PRESENT: Primary | ICD-10-CM

## 2020-06-15 DIAGNOSIS — S39.012A STRAIN OF LUMBAR REGION, INITIAL ENCOUNTER: ICD-10-CM

## 2020-06-15 PROCEDURE — 97014 ELECTRIC STIMULATION THERAPY: CPT | Performed by: PHYSICAL THERAPIST

## 2020-06-15 PROCEDURE — 97110 THERAPEUTIC EXERCISES: CPT | Performed by: PHYSICAL THERAPIST

## 2020-06-15 NOTE — PROGRESS NOTES
Physical Therapy Daily Progress Note        Patient: Pina Denis   : 1990  Diagnosis/ICD-10 Code:  Low back pain, unspecified back pain laterality, unspecified chronicity, unspecified whether sciatica present [M54.5]  Referring practitioner: Nolan Hernandez MD  Date of Initial Visit: Type: THERAPY  Noted: 2020  Today's Date: 6/15/2020  Patient seen for 4 sessions         Pina Dockeryn reports: still having right SI/buttock and calf pain, frustrated      Subjective     Objective   See Exercise, Manual, and Modality Logs for complete treatment.   Changed focus to strength vs stretching, added glut ex as in reverse step up, bridge, reverse elliptical and prone LE ext over pillows, minimal ROM  Right ant innominate today, given option of scissor correction      Assessment/Plan    Suspect continued hypermobility a problem, focus on glut strength and prox stab           Timed:         Manual Therapy:    0     mins  15137;     Therapeutic Exercise:    30     mins  97482;     Neuromuscular Neal:    0    mins  24606;    Therapeutic Activity:     0     mins  44993;     Gait Trainin     mins  06451;     Ultrasound:     0     mins  37837;    Ionto                               0    mins   30534  Self Care                       0     mins   16743  Canalith Repos               0    mins  4209    Un-Timed:  Electrical Stimulation:    15     mins  30669 ( );  Dry Needling     0     mins self-pay  Traction     0     mins 90517  Low Eval     0     Mins  20526  Mod Eval     0     Mins  59245  High Eval                       0     Mins  23372    Timed Treatment:   30   mins   Total Treatment:     45   mins    Yohana Holly PT  Physical Therapist  [place] license [id number]

## 2020-08-26 ENCOUNTER — DOCUMENTATION (OUTPATIENT)
Dept: PHYSICAL THERAPY | Facility: CLINIC | Age: 30
End: 2020-08-26

## 2020-08-26 NOTE — PROGRESS NOTES
Discharge Summary  Discharge Summary from Physical Therapy Report      Dates  PT visit: date of last visit 6/15/20  Number of Visits: 4     Discharge Status of Patient: See  Note dated 6/15/20    Goals: Not Met    Discharge Plan: early DC, did not complete POC, present status unknown    Comments     Date of Discharge 8/26/20        Yohana Holly, PT  Physical Therapist

## 2021-01-20 ENCOUNTER — OFFICE VISIT (OUTPATIENT)
Dept: CARDIOLOGY | Facility: CLINIC | Age: 31
End: 2021-01-20

## 2021-01-20 VITALS
DIASTOLIC BLOOD PRESSURE: 79 MMHG | BODY MASS INDEX: 25.31 KG/M2 | WEIGHT: 157.5 LBS | HEART RATE: 88 BPM | OXYGEN SATURATION: 100 % | SYSTOLIC BLOOD PRESSURE: 111 MMHG | HEIGHT: 66 IN | TEMPERATURE: 97.7 F

## 2021-01-20 DIAGNOSIS — R00.2 PALPITATIONS: Primary | ICD-10-CM

## 2021-01-20 PROCEDURE — 99214 OFFICE O/P EST MOD 30 MIN: CPT | Performed by: INTERNAL MEDICINE

## 2021-01-20 NOTE — PROGRESS NOTES
Encounter Date:01/20/2021  Last seen 8/15/2019      Patient ID: Pina Denis is a 30 y.o. female.    Chief Complaint:  Palpitations-increased        History of Present Illness  Recently she has been having increased palpitations.  Increase in frequency and duration.    Since I have last seen, the patient has been without any chest discomfort ,shortness of breath,, dizziness or syncope.  Denies having any headache ,abdominal pain ,nausea, vomiting , diarrhea constipation, loss of weight or loss of appetite.  Denies having any excessive bruising ,hematuria or blood in the stool.    Review of all systems negative except as indicated.    Reviewed ROS.  Assessment and Plan      ]]]]]]]]]]]]]]]]]  Impression  ==========  -Palpitations possible SVT although no documentation is available at this time.-Increased recently    48-hour Holter monitor showed sinus rhythm with occasional premature ventricular contractions.  Echocardiogram 8/15/2019 is normal.     -Borderline potassium at 3.8.  Magnesium level is not available.     -Family history is positive for coronary disease.     - Intolerance to amoxicillin due to palpitations.     - Non-smoker.  =============  Plan  =========  Patient is having increased palpitations recently.  Recent EKG from outside was independently reviewed that showed sinus rhythm nonspecific ST-T wave changes  Patient have lab work including CBC BMP magnesium level and TSH.  Follow-up in the office with an echocardiogram  Patient was started on metoprolol XL 25 mg a day  Further plan will depend on patient's progress.    ]]]]]]]]]]]]]]]]                         Diagnosis Plan   1. Palpitations  Adult Transthoracic Echo Complete W/ Cont if Necessary Per Protocol    Basic Metabolic Panel    CBC & Differential    Magnesium    TSH   LAB RESULTS (LAST 7 DAYS)    CBC        BMP        CMP         BNP        TROPONIN        CoAg        Creatinine Clearance  CrCl cannot be calculated (Patient's most recent  lab result is older than the maximum 30 days allowed.).    ABG        Radiology  No radiology results for the last day                The following portions of the patient's history were reviewed and updated as appropriate: allergies, current medications, past family history, past medical history, past social history, past surgical history and problem list.    Review of Systems   Constitution: Negative for malaise/fatigue.   Cardiovascular: Negative for chest pain, leg swelling, palpitations and syncope.   Respiratory: Negative for shortness of breath.    Skin: Negative for rash.   Gastrointestinal: Negative for nausea and vomiting.   Neurological: Negative for dizziness, light-headedness and numbness.         Current Outpatient Medications:   •  LESSINA 0.1-20 MG-MCG per tablet, Take 1 tablet by mouth Daily., Disp: , Rfl: 0    Allergies   Allergen Reactions   • Amoxicillin Palpitations   • Loratadine-Pseudoephedrine Er Palpitations       Family History   Problem Relation Age of Onset   • Hypertension Mother    • Thyroid disease Mother    • Heart disease Paternal Great-Grandfather    • Heart attack Paternal Great-Grandfather    • Heart attack Paternal Great-Grandmother    • Heart disease Paternal Great-Grandmother        Past Surgical History:   Procedure Laterality Date   • BACK SURGERY  09/2020    lower        Past Medical History:   Diagnosis Date   • Aplastic anemia (CMS/HCC)    • Chronic idiopathic thrombocytopenia (CMS/HCC)        Family History   Problem Relation Age of Onset   • Hypertension Mother    • Thyroid disease Mother    • Heart disease Paternal Great-Grandfather    • Heart attack Paternal Great-Grandfather    • Heart attack Paternal Great-Grandmother    • Heart disease Paternal Great-Grandmother        Social History     Socioeconomic History   • Marital status:      Spouse name: Not on file   • Number of children: Not on file   • Years of education: Not on file   • Highest education level:  "Not on file   Tobacco Use   • Smoking status: Never Smoker   • Smokeless tobacco: Never Used   Substance and Sexual Activity   • Alcohol use: Yes     Comment: consumed monthly, a glass of wine          Procedures      Objective:       Physical Exam    /79   Pulse 88   Temp 97.7 °F (36.5 °C)   Ht 167.6 cm (66\")   Wt 71.4 kg (157 lb 8 oz)   SpO2 100%   BMI 25.42 kg/m²   The patient is alert, oriented and in no distress.    Vital signs as noted above.    Head and neck revealed no carotid bruits or jugular venous distension.  No thyromegaly or lymphadenopathy is present.    Lungs clear.  No wheezing.  Breath sounds are normal bilaterally.    Heart normal first and second heart sounds.  No murmur..  No pericardial rub is present.  No gallop is present.    Abdomen soft and nontender.  No organomegaly is present.    Extremities revealed good peripheral pulses without any pedal edema.    Skin warm and dry.    Musculoskeletal system is grossly normal.    CNS grossly normal.    Reviewed and unchanged from last visit.        "

## 2021-01-28 ENCOUNTER — OFFICE VISIT (OUTPATIENT)
Dept: CARDIOLOGY | Facility: CLINIC | Age: 31
End: 2021-01-28

## 2021-01-28 ENCOUNTER — APPOINTMENT (OUTPATIENT)
Dept: CARDIOLOGY | Facility: HOSPITAL | Age: 31
End: 2021-01-28

## 2021-01-28 ENCOUNTER — HOSPITAL ENCOUNTER (OUTPATIENT)
Dept: CARDIOLOGY | Facility: HOSPITAL | Age: 31
Discharge: HOME | End: 2021-01-28
Admitting: INTERNAL MEDICINE

## 2021-01-28 VITALS
DIASTOLIC BLOOD PRESSURE: 64 MMHG | WEIGHT: 157 LBS | SYSTOLIC BLOOD PRESSURE: 113 MMHG | HEIGHT: 66 IN | BODY MASS INDEX: 25.23 KG/M2

## 2021-01-28 VITALS
SYSTOLIC BLOOD PRESSURE: 113 MMHG | BODY MASS INDEX: 25.23 KG/M2 | WEIGHT: 157 LBS | DIASTOLIC BLOOD PRESSURE: 64 MMHG | HEIGHT: 66 IN | HEART RATE: 84 BPM | TEMPERATURE: 97.7 F

## 2021-01-28 DIAGNOSIS — R00.2 PALPITATIONS: ICD-10-CM

## 2021-01-28 DIAGNOSIS — R00.2 PALPITATIONS: Primary | ICD-10-CM

## 2021-01-28 LAB
BH CV ECHO MEAS - ACS: 1.6 CM
BH CV ECHO MEAS - AO MAX PG (FULL): 3.2 MMHG
BH CV ECHO MEAS - AO MAX PG: 8.2 MMHG
BH CV ECHO MEAS - AO MEAN PG (FULL): 1.5 MMHG
BH CV ECHO MEAS - AO MEAN PG: 4.4 MMHG
BH CV ECHO MEAS - AO ROOT AREA (BSA CORRECTED): 1.5
BH CV ECHO MEAS - AO ROOT AREA: 5.5 CM^2
BH CV ECHO MEAS - AO ROOT DIAM: 2.7 CM
BH CV ECHO MEAS - AO V2 MAX: 143.1 CM/SEC
BH CV ECHO MEAS - AO V2 MEAN: 98.8 CM/SEC
BH CV ECHO MEAS - AO V2 VTI: 25.8 CM
BH CV ECHO MEAS - AVA(I,A): 2.1 CM^2
BH CV ECHO MEAS - AVA(I,D): 2.1 CM^2
BH CV ECHO MEAS - AVA(V,A): 2.1 CM^2
BH CV ECHO MEAS - AVA(V,D): 2.1 CM^2
BH CV ECHO MEAS - BSA(HAYCOCK): 1.8 M^2
BH CV ECHO MEAS - BSA: 1.8 M^2
BH CV ECHO MEAS - BZI_BMI: 25.3 KILOGRAMS/M^2
BH CV ECHO MEAS - BZI_METRIC_HEIGHT: 167.6 CM
BH CV ECHO MEAS - BZI_METRIC_WEIGHT: 71.2 KG
BH CV ECHO MEAS - EDV(CUBED): 93.8 ML
BH CV ECHO MEAS - EDV(TEICH): 94.5 ML
BH CV ECHO MEAS - EF(CUBED): 51.9 %
BH CV ECHO MEAS - EF(TEICH): 43.9 %
BH CV ECHO MEAS - ESV(CUBED): 45.1 ML
BH CV ECHO MEAS - ESV(TEICH): 53 ML
BH CV ECHO MEAS - FS: 21.6 %
BH CV ECHO MEAS - IVS/LVPW: 0.62
BH CV ECHO MEAS - IVSD: 0.49 CM
BH CV ECHO MEAS - LA DIMENSION: 3.2 CM
BH CV ECHO MEAS - LA/AO: 1.2
BH CV ECHO MEAS - LV MASS(C)D: 88.3 GRAMS
BH CV ECHO MEAS - LV MASS(C)DI: 49 GRAMS/M^2
BH CV ECHO MEAS - LV MAX PG: 5 MMHG
BH CV ECHO MEAS - LV MEAN PG: 2.8 MMHG
BH CV ECHO MEAS - LV V1 MAX: 112.1 CM/SEC
BH CV ECHO MEAS - LV V1 MEAN: 79.8 CM/SEC
BH CV ECHO MEAS - LV V1 VTI: 20.3 CM
BH CV ECHO MEAS - LVIDD: 4.5 CM
BH CV ECHO MEAS - LVIDS: 3.6 CM
BH CV ECHO MEAS - LVOT AREA: 2.7 CM^2
BH CV ECHO MEAS - LVOT DIAM: 1.8 CM
BH CV ECHO MEAS - LVPWD: 0.8 CM
BH CV ECHO MEAS - MV A MAX VEL: 78.3 CM/SEC
BH CV ECHO MEAS - MV DEC SLOPE: 737 CM/SEC^2
BH CV ECHO MEAS - MV DEC TIME: 0.12 SEC
BH CV ECHO MEAS - MV E MAX VEL: 90.7 CM/SEC
BH CV ECHO MEAS - MV E/A: 1.2
BH CV ECHO MEAS - MV MAX PG: 6.2 MMHG
BH CV ECHO MEAS - MV MEAN PG: 3.6 MMHG
BH CV ECHO MEAS - MV V2 MAX: 124.4 CM/SEC
BH CV ECHO MEAS - MV V2 MEAN: 91.9 CM/SEC
BH CV ECHO MEAS - MV V2 VTI: 20.1 CM
BH CV ECHO MEAS - MVA(VTI): 2.7 CM^2
BH CV ECHO MEAS - PA ACC TIME: 0.12 SEC
BH CV ECHO MEAS - PA PR(ACCEL): 26.1 MMHG
BH CV ECHO MEAS - RV MAX PG: 2.8 MMHG
BH CV ECHO MEAS - RV MEAN PG: 1.4 MMHG
BH CV ECHO MEAS - RV V1 MAX: 83.5 CM/SEC
BH CV ECHO MEAS - RV V1 MEAN: 56.3 CM/SEC
BH CV ECHO MEAS - RV V1 VTI: 15.8 CM
BH CV ECHO MEAS - RVDD: 2.3 CM
BH CV ECHO MEAS - SI(AO): 78.9 ML/M^2
BH CV ECHO MEAS - SI(CUBED): 26.9 ML/M^2
BH CV ECHO MEAS - SI(LVOT): 30 ML/M^2
BH CV ECHO MEAS - SI(TEICH): 23 ML/M^2
BH CV ECHO MEAS - SV(AO): 142.3 ML
BH CV ECHO MEAS - SV(CUBED): 48.6 ML
BH CV ECHO MEAS - SV(LVOT): 54.1 ML
BH CV ECHO MEAS - SV(TEICH): 41.5 ML
BH CV ECHO MEAS - TR MAX VEL: 235.4 CM/SEC
LV EF 2D ECHO EST: 60 %
MAXIMAL PREDICTED HEART RATE: 188 BPM
STRESS TARGET HR: 160 BPM

## 2021-01-28 PROCEDURE — 93306 TTE W/DOPPLER COMPLETE: CPT

## 2021-01-28 PROCEDURE — 93306 TTE W/DOPPLER COMPLETE: CPT | Performed by: INTERNAL MEDICINE

## 2021-01-28 PROCEDURE — 99213 OFFICE O/P EST LOW 20 MIN: CPT | Performed by: INTERNAL MEDICINE

## 2021-01-28 NOTE — PROGRESS NOTES
Encounter Date:01/28/2021  Last seen 1/20/2021      Patient ID: Pina Denis is a 30 y.o. female.    Chief Complaint:    Palpitations        History of Present Illness  Palpitations are better.Since I have last seen, the patient has been without any chest discomfort ,shortness of breath,, dizziness or syncope.  Denies having any headache ,abdominal pain ,nausea, vomiting , diarrhea constipation, loss of weight or loss of appetite.  Denies having any excessive bruising ,hematuria or blood in the stool.     Review of all systems negative except as indicated.     Reviewed ROS.  Assessment and Plan      ]]]]]]]]]]]]]]]]]  Impression  ==========  -Palpitations possible SVT although no documentation is available at this time.-Doing better.  Echocardiogram-normal 1/28/2021     48-hour Holter monitor showed sinus rhythm with occasional premature ventricular contractions.  Echocardiogram 8/15/2019 is normal.     -Borderline potassium at 3.8.  Magnesium level is not available.     -Family history is positive for coronary disease.     - Intolerance to amoxicillin due to palpitations.     - Non-smoker.  =============  Plan  =========  Patient was recently having increased palpitations.  Recent EKG from outside was independently reviewed that showed sinus rhythm nonspecific ST-T wave changes  Patient have lab work including CBC BMP magnesium level and TSH.  Lab work reviewed and normal.  Echocardiogram today-as above  Patient was started on metoprolol XL 25 mg a day.  Patient is taking it on a as needed basis  Follow-up in the office in 6 months.  Further plan will depend on patient's progress.    ]]]]]]]]]]]]]]]]             Diagnosis Plan   1. Palpitations     LAB RESULTS (LAST 7 DAYS)    CBC        BMP        CMP         BNP        TROPONIN        CoAg        Creatinine Clearance  CrCl cannot be calculated (Patient's most recent lab result is older than the maximum 30 days allowed.).    ABG        Radiology  No radiology  results for the last day                The following portions of the patient's history were reviewed and updated as appropriate: allergies, current medications, past family history, past medical history, past social history, past surgical history and problem list.    Review of Systems   Constitution: Negative for malaise/fatigue.   Cardiovascular: Positive for palpitations (much better than before, still present). Negative for chest pain, leg swelling and syncope.   Respiratory: Negative for shortness of breath.    Skin: Negative for rash.   Gastrointestinal: Negative for nausea and vomiting.   Neurological: Negative for dizziness, light-headedness and numbness.         Current Outpatient Medications:   •  LESSINA 0.1-20 MG-MCG per tablet, Take 1 tablet by mouth Daily., Disp: , Rfl: 0    Allergies   Allergen Reactions   • Amoxicillin Palpitations   • Loratadine-Pseudoephedrine Er Palpitations       Family History   Problem Relation Age of Onset   • Hypertension Mother    • Thyroid disease Mother    • Heart disease Paternal Great-Grandfather    • Heart attack Paternal Great-Grandfather    • Heart attack Paternal Great-Grandmother    • Heart disease Paternal Great-Grandmother        Past Surgical History:   Procedure Laterality Date   • BACK SURGERY  09/2020    lower        Past Medical History:   Diagnosis Date   • Aplastic anemia (CMS/HCC)    • Chronic idiopathic thrombocytopenia (CMS/HCC)        Family History   Problem Relation Age of Onset   • Hypertension Mother    • Thyroid disease Mother    • Heart disease Paternal Great-Grandfather    • Heart attack Paternal Great-Grandfather    • Heart attack Paternal Great-Grandmother    • Heart disease Paternal Great-Grandmother        Social History     Socioeconomic History   • Marital status:      Spouse name: Not on file   • Number of children: Not on file   • Years of education: Not on file   • Highest education level: Not on file   Tobacco Use   • Smoking  "status: Never Smoker   • Smokeless tobacco: Never Used   Substance and Sexual Activity   • Alcohol use: Yes     Comment: consumed monthly, a glass of wine          Procedures      Objective:       Physical Exam    /64   Pulse 84   Temp 97.7 °F (36.5 °C)   Ht 167.6 cm (66\")   Wt 71.2 kg (157 lb)   BMI 25.34 kg/m²   The patient is alert, oriented and in no distress.    Vital signs as noted above.    Head and neck revealed no carotid bruits or jugular venous distension.  No thyromegaly or lymphadenopathy is present.    Lungs clear.  No wheezing.  Breath sounds are normal bilaterally.    Heart normal first and second heart sounds.  No murmur..  No pericardial rub is present.  No gallop is present.    Abdomen soft and nontender.  No organomegaly is present.    Extremities revealed good peripheral pulses without any pedal edema.    Skin warm and dry.    Musculoskeletal system is grossly normal.    CNS grossly normal.    Reviewed and unchanged from last visit.        "

## 2021-02-02 ENCOUNTER — APPOINTMENT (OUTPATIENT)
Dept: CARDIOLOGY | Facility: HOSPITAL | Age: 31
End: 2021-02-02

## 2024-09-23 ENCOUNTER — HOSPITAL ENCOUNTER (EMERGENCY)
Facility: HOSPITAL | Age: 34
Discharge: HOME | End: 2024-09-24
Attending: EMERGENCY MEDICINE | Admitting: EMERGENCY MEDICINE
Payer: COMMERCIAL

## 2024-09-23 ENCOUNTER — APPOINTMENT (OUTPATIENT)
Dept: GENERAL RADIOLOGY | Facility: HOSPITAL | Age: 34
End: 2024-09-23
Payer: COMMERCIAL

## 2024-09-23 DIAGNOSIS — R07.9 CHEST PAIN, UNSPECIFIED TYPE: Primary | ICD-10-CM

## 2024-09-23 LAB
ANION GAP SERPL CALCULATED.3IONS-SCNC: 12.1 MMOL/L (ref 5–15)
ANISOCYTOSIS BLD QL: ABNORMAL
B PARAPERT DNA SPEC QL NAA+PROBE: NOT DETECTED
B PERT DNA SPEC QL NAA+PROBE: NOT DETECTED
BUN SERPL-MCNC: 9 MG/DL (ref 6–20)
BUN/CREAT SERPL: 12.9 (ref 7–25)
C PNEUM DNA NPH QL NAA+NON-PROBE: NOT DETECTED
CALCIUM SPEC-SCNC: 9.5 MG/DL (ref 8.6–10.5)
CHLORIDE SERPL-SCNC: 103 MMOL/L (ref 98–107)
CO2 SERPL-SCNC: 24.9 MMOL/L (ref 22–29)
CREAT SERPL-MCNC: 0.7 MG/DL (ref 0.57–1)
D DIMER PPP FEU-MCNC: 0.41 MG/L (FEU) (ref 0–0.5)
DACRYOCYTES BLD QL SMEAR: ABNORMAL
DEPRECATED RDW RBC AUTO: 54.1 FL (ref 37–54)
EGFRCR SERPLBLD CKD-EPI 2021: 115.1 ML/MIN/1.73
ERYTHROCYTE [DISTWIDTH] IN BLOOD BY AUTOMATED COUNT: 13 % (ref 12.3–15.4)
FLUAV SUBTYP SPEC NAA+PROBE: NOT DETECTED
FLUBV RNA ISLT QL NAA+PROBE: NOT DETECTED
GLUCOSE SERPL-MCNC: 99 MG/DL (ref 65–99)
HADV DNA SPEC NAA+PROBE: NOT DETECTED
HCOV 229E RNA SPEC QL NAA+PROBE: NOT DETECTED
HCOV HKU1 RNA SPEC QL NAA+PROBE: NOT DETECTED
HCOV NL63 RNA SPEC QL NAA+PROBE: NOT DETECTED
HCOV OC43 RNA SPEC QL NAA+PROBE: NOT DETECTED
HCT VFR BLD AUTO: 37.6 % (ref 34–46.6)
HGB BLD-MCNC: 12.7 G/DL (ref 12–15.9)
HMPV RNA NPH QL NAA+NON-PROBE: NOT DETECTED
HPIV1 RNA ISLT QL NAA+PROBE: NOT DETECTED
HPIV2 RNA SPEC QL NAA+PROBE: NOT DETECTED
HPIV3 RNA NPH QL NAA+PROBE: NOT DETECTED
HPIV4 P GENE NPH QL NAA+PROBE: NOT DETECTED
LARGE PLATELETS: ABNORMAL
LYMPHOCYTES # BLD MANUAL: 2.93 10*3/MM3 (ref 0.7–3.1)
LYMPHOCYTES NFR BLD MANUAL: 1 % (ref 5–12)
M PNEUMO IGG SER IA-ACNC: NOT DETECTED
MACROCYTES BLD QL SMEAR: ABNORMAL
MCH RBC QN AUTO: 37.9 PG (ref 26.6–33)
MCHC RBC AUTO-ENTMCNC: 33.8 G/DL (ref 31.5–35.7)
MCV RBC AUTO: 112.2 FL (ref 79–97)
MONOCYTES # BLD: 0.06 10*3/MM3 (ref 0.1–0.9)
NEUTROPHILS # BLD AUTO: 3.12 10*3/MM3 (ref 1.7–7)
NEUTROPHILS NFR BLD MANUAL: 51 % (ref 42.7–76)
PLATELET # BLD AUTO: 141 10*3/MM3 (ref 140–450)
PMV BLD AUTO: 9.2 FL (ref 6–12)
POIKILOCYTOSIS BLD QL SMEAR: ABNORMAL
POTASSIUM SERPL-SCNC: 3.9 MMOL/L (ref 3.5–5.2)
RBC # BLD AUTO: 3.35 10*6/MM3 (ref 3.77–5.28)
RHINOVIRUS RNA SPEC NAA+PROBE: NOT DETECTED
RSV RNA NPH QL NAA+NON-PROBE: NOT DETECTED
SARS-COV-2 RNA NPH QL NAA+NON-PROBE: NOT DETECTED
SCAN SLIDE: NORMAL
SMALL PLATELETS BLD QL SMEAR: ADEQUATE
SODIUM SERPL-SCNC: 140 MMOL/L (ref 136–145)
TROPONIN T SERPL HS-MCNC: <6 NG/L
VARIANT LYMPHS NFR BLD MANUAL: 42 % (ref 19.6–45.3)
VARIANT LYMPHS NFR BLD MANUAL: 6 % (ref 0–5)
WBC MORPH BLD: NORMAL
WBC NRBC COR # BLD AUTO: 6.11 10*3/MM3 (ref 3.4–10.8)

## 2024-09-23 PROCEDURE — 93005 ELECTROCARDIOGRAM TRACING: CPT | Performed by: NURSE PRACTITIONER

## 2024-09-23 PROCEDURE — 85379 FIBRIN DEGRADATION QUANT: CPT | Performed by: NURSE PRACTITIONER

## 2024-09-23 PROCEDURE — 0202U NFCT DS 22 TRGT SARS-COV-2: CPT | Performed by: NURSE PRACTITIONER

## 2024-09-23 PROCEDURE — 36415 COLL VENOUS BLD VENIPUNCTURE: CPT

## 2024-09-23 PROCEDURE — 80048 BASIC METABOLIC PNL TOTAL CA: CPT | Performed by: NURSE PRACTITIONER

## 2024-09-23 PROCEDURE — 84484 ASSAY OF TROPONIN QUANT: CPT | Performed by: NURSE PRACTITIONER

## 2024-09-23 PROCEDURE — 71046 X-RAY EXAM CHEST 2 VIEWS: CPT

## 2024-09-23 PROCEDURE — 99284 EMERGENCY DEPT VISIT MOD MDM: CPT

## 2024-09-23 PROCEDURE — 85025 COMPLETE CBC W/AUTO DIFF WBC: CPT | Performed by: NURSE PRACTITIONER

## 2024-09-23 PROCEDURE — 85007 BL SMEAR W/DIFF WBC COUNT: CPT | Performed by: NURSE PRACTITIONER

## 2024-09-24 VITALS
DIASTOLIC BLOOD PRESSURE: 76 MMHG | WEIGHT: 149.47 LBS | TEMPERATURE: 97.8 F | OXYGEN SATURATION: 98 % | SYSTOLIC BLOOD PRESSURE: 118 MMHG | RESPIRATION RATE: 16 BRPM | HEIGHT: 66 IN | HEART RATE: 87 BPM | BODY MASS INDEX: 24.02 KG/M2

## 2024-09-24 LAB
QT INTERVAL: 407 MS
QTC INTERVAL: 467 MS

## 2024-09-24 RX ORDER — METHOCARBAMOL 750 MG/1
750 TABLET, FILM COATED ORAL 3 TIMES DAILY PRN
Qty: 15 TABLET | Refills: 0 | Status: SHIPPED | OUTPATIENT
Start: 2024-09-24

## 2024-09-24 RX ORDER — LIDOCAINE 50 MG/G
1 PATCH TOPICAL EVERY 24 HOURS
Qty: 10 EACH | Refills: 0 | Status: SHIPPED | OUTPATIENT
Start: 2024-09-24